# Patient Record
Sex: FEMALE
[De-identification: names, ages, dates, MRNs, and addresses within clinical notes are randomized per-mention and may not be internally consistent; named-entity substitution may affect disease eponyms.]

---

## 2017-04-10 PROBLEM — M17.12 PRIMARY OSTEOARTHRITIS OF LEFT KNEE: Status: ACTIVE | Noted: 2017-04-10

## 2017-11-01 ENCOUNTER — CHARTING TRANS (OUTPATIENT)
Dept: OTHER | Age: 72
End: 2017-11-01

## 2017-12-15 PROBLEM — M81.0 OSTEOPOROSIS: Status: ACTIVE | Noted: 2017-12-15

## 2017-12-15 PROCEDURE — 83970 ASSAY OF PARATHORMONE: CPT | Performed by: PHYSICIAN ASSISTANT

## 2018-03-21 PROBLEM — S83.411A SPRAIN OF MEDIAL COLLATERAL LIGAMENT OF RIGHT KNEE: Status: ACTIVE | Noted: 2018-03-21

## 2018-03-21 PROBLEM — M17.11 PRIMARY OSTEOARTHRITIS OF RIGHT KNEE: Status: ACTIVE | Noted: 2018-03-21

## 2019-10-03 PROBLEM — M18.11 ARTHRITIS OF CARPOMETACARPAL (CMC) JOINT OF RIGHT THUMB: Status: ACTIVE | Noted: 2019-10-03

## 2019-10-03 PROBLEM — S63.501A SPRAIN OF RIGHT WRIST, INITIAL ENCOUNTER: Status: ACTIVE | Noted: 2019-10-03

## 2019-10-21 ENCOUNTER — WALK IN (OUTPATIENT)
Dept: URGENT CARE | Age: 74
End: 2019-10-21

## 2019-10-21 DIAGNOSIS — Z23 FLU VACCINE NEED: Primary | ICD-10-CM

## 2019-10-21 PROCEDURE — G0008 ADMIN INFLUENZA VIRUS VAC: HCPCS | Performed by: NURSE PRACTITIONER

## 2019-10-21 PROCEDURE — 90662 IIV NO PRSV INCREASED AG IM: CPT | Performed by: NURSE PRACTITIONER

## 2020-06-16 ENCOUNTER — TELEPHONE (OUTPATIENT)
Dept: SURGERY | Facility: CLINIC | Age: 75
End: 2020-06-16

## 2020-06-16 NOTE — TELEPHONE ENCOUNTER
Dr Hutchison wants Dr Wei Oneal to see pt for a lump on head & pt ignored it during covid. Dr had CT done & there is a mass.  Please advise

## 2020-06-17 ENCOUNTER — OFFICE VISIT (OUTPATIENT)
Dept: SURGERY | Facility: CLINIC | Age: 75
End: 2020-06-17
Payer: MEDICARE

## 2020-06-17 VITALS
DIASTOLIC BLOOD PRESSURE: 72 MMHG | BODY MASS INDEX: 21.76 KG/M2 | WEIGHT: 137 LBS | HEIGHT: 66.5 IN | HEART RATE: 80 BPM | SYSTOLIC BLOOD PRESSURE: 118 MMHG

## 2020-06-17 DIAGNOSIS — M89.9 SKULL LESION: Primary | ICD-10-CM

## 2020-06-17 DIAGNOSIS — M79.89 SOFT TISSUE MASS: ICD-10-CM

## 2020-06-17 PROCEDURE — 99204 OFFICE O/P NEW MOD 45 MIN: CPT | Performed by: PHYSICIAN ASSISTANT

## 2020-06-17 NOTE — PROGRESS NOTES
New Pt here for brain lesion     CT of the brain: Obtained     Pt states that back in Feb 2020 she had bumped her head getting into the car. Pt states that she has a lump on the left side of the sb.

## 2020-06-17 NOTE — PROGRESS NOTES
[facility]  Neurosurgery Consult    Tanesha Sherry Patient Status:  No patient class for patient encounter    1945 MRN [id number]     REASON FOR CONSULTATION:  Skull and soft tissue lesion.      HISTORY OF PRESENT ILLNESS:  Norva Mention stenosis        PAST SURGICAL HISTORY:  Past Surgical History:   Procedure Laterality Date   • BACK SURGERY     • CHOLECYSTECTOMY     • LUMBAR EPIDURAL N/A 12/4/2014    Performed by Serafin Buchanan MD at [address] [place] [address] Drift. Finger-to-nose coordination is intact. Gait deferred. DTRs symmetric. No clonus. Neg More's.     Upper extremity strength:      Deltoid  Biceps  Triceps   W.flexion  W.extension    Finger abduction     Right 5 5 5 5  5 5 5     Left 5 5 5 5 lesion along the left frontotemporoparietal convexity measuring up to 0.6 cm  from the inner table with mild-to-moderate mass effect and associated 0.6 cm rightward midline  shift.  There is abnormal appearance of the underlying left frontotemporal parietal

## 2020-06-23 ENCOUNTER — TELEPHONE (OUTPATIENT)
Dept: SURGERY | Facility: CLINIC | Age: 75
End: 2020-06-23

## 2020-06-23 DIAGNOSIS — L98.9 SCALP LESION: Primary | ICD-10-CM

## 2020-06-23 DIAGNOSIS — M79.89 SOFT TISSUE MASS: ICD-10-CM

## 2020-06-23 NOTE — TELEPHONE ENCOUNTER
Called pt. No answer. LMTCB. Please let me know when she calls and I will attempt to call her again.

## 2020-06-23 NOTE — TELEPHONE ENCOUNTER
Pt calling stating she has hx of sarcoidosis, would like providers to be aware when viewing the results for recent imaging

## 2020-06-24 NOTE — TELEPHONE ENCOUNTER
Called pt to discuss MRI results. Discussed in tumor conference-recommendation is to have a biopsy done with IR. IR referral placed and pt given information to schedule.  Once it is scheduled, she will call to schedule an appointment with Dr. Shamika Savage for tabitha

## 2020-09-11 PROBLEM — S63.501A SPRAIN OF RIGHT WRIST, INITIAL ENCOUNTER: Status: RESOLVED | Noted: 2019-10-03 | Resolved: 2020-09-11

## 2020-09-11 PROBLEM — M79.89 SOFT TISSUE MASS: Status: RESOLVED | Noted: 2020-06-17 | Resolved: 2020-09-11

## 2020-09-11 PROBLEM — S83.411A SPRAIN OF MEDIAL COLLATERAL LIGAMENT OF RIGHT KNEE: Status: RESOLVED | Noted: 2018-03-21 | Resolved: 2020-09-11

## 2020-09-11 PROBLEM — C83.38 DIFFUSE LARGE B-CELL LYMPHOMA OF LYMPH NODES OF MULTIPLE REGIONS (HCC): Status: ACTIVE | Noted: 2020-09-11

## 2020-09-11 PROBLEM — M17.0 PRIMARY OSTEOARTHRITIS OF BOTH KNEES: Status: ACTIVE | Noted: 2018-03-21

## 2020-09-11 PROBLEM — C83.30 DLBCL (DIFFUSE LARGE B CELL LYMPHOMA) (HCC): Status: ACTIVE | Noted: 2020-09-11

## 2021-01-22 PROBLEM — E22.2 SYNDROME OF INAPPROPRIATE SECRETION OF ANTIDIURETIC HORMONE (HCC): Status: ACTIVE | Noted: 2021-01-22

## 2022-07-14 ENCOUNTER — TELEPHONE (OUTPATIENT)
Dept: SCHEDULING | Age: 77
End: 2022-07-14

## 2022-07-14 ENCOUNTER — OFFICE VISIT (OUTPATIENT)
Dept: URGENT CARE | Age: 77
End: 2022-07-14

## 2022-07-14 VITALS
OXYGEN SATURATION: 99 % | TEMPERATURE: 98 F | SYSTOLIC BLOOD PRESSURE: 122 MMHG | HEART RATE: 85 BPM | WEIGHT: 138 LBS | RESPIRATION RATE: 16 BRPM | HEIGHT: 66 IN | BODY MASS INDEX: 22.18 KG/M2 | DIASTOLIC BLOOD PRESSURE: 68 MMHG

## 2022-07-14 DIAGNOSIS — B96.89 ACUTE BACTERIAL RHINOSINUSITIS: Primary | ICD-10-CM

## 2022-07-14 DIAGNOSIS — J01.90 ACUTE BACTERIAL RHINOSINUSITIS: Primary | ICD-10-CM

## 2022-07-14 PROBLEM — M18.11 ARTHRITIS OF CARPOMETACARPAL (CMC) JOINT OF RIGHT THUMB: Status: ACTIVE | Noted: 2019-10-03

## 2022-07-14 PROBLEM — T45.1X5A CHEMOTHERAPY INDUCED NEUTROPENIA (CMD): Status: ACTIVE | Noted: 2020-09-03

## 2022-07-14 PROBLEM — D70.1 CHEMOTHERAPY INDUCED NEUTROPENIA (CMD): Status: ACTIVE | Noted: 2020-09-03

## 2022-07-14 PROBLEM — R93.3 ABNORMAL FINDINGS ON EXAMINATION OF GASTROINTESTINAL TRACT: Status: ACTIVE | Noted: 2021-07-06

## 2022-07-14 PROBLEM — T45.1X5A ANEMIA ASSOCIATED WITH CHEMOTHERAPY: Status: ACTIVE | Noted: 2020-09-03

## 2022-07-14 PROBLEM — D64.81 ANEMIA ASSOCIATED WITH CHEMOTHERAPY: Status: ACTIVE | Noted: 2020-09-03

## 2022-07-14 PROBLEM — H04.123 DRY EYE SYNDROME OF BILATERAL LACRIMAL GLANDS: Status: ACTIVE | Noted: 2017-04-07

## 2022-07-14 PROBLEM — M81.0 OSTEOPOROSIS: Status: ACTIVE | Noted: 2017-12-15

## 2022-07-14 PROBLEM — C83.38 DIFFUSE LARGE B-CELL LYMPHOMA OF LYMPH NODES OF MULTIPLE REGIONS (CMD): Status: ACTIVE | Noted: 2020-07-28

## 2022-07-14 PROCEDURE — 99214 OFFICE O/P EST MOD 30 MIN: CPT | Performed by: NURSE PRACTITIONER

## 2022-07-14 RX ORDER — AMOXICILLIN AND CLAVULANATE POTASSIUM 875; 125 MG/1; MG/1
1 TABLET, FILM COATED ORAL EVERY 12 HOURS
Qty: 14 TABLET | Refills: 0 | Status: SHIPPED | OUTPATIENT
Start: 2022-07-14 | End: 2022-07-21

## 2022-07-14 RX ORDER — GEMFIBROZIL 600 MG/1
600 TABLET, FILM COATED ORAL DAILY
COMMUNITY

## 2022-07-14 ASSESSMENT — ENCOUNTER SYMPTOMS
EYES NEGATIVE: 1
SHORTNESS OF BREATH: 0
HEADACHES: 1
RESPIRATORY NEGATIVE: 1
WHEEZING: 0
SINUS PAIN: 1
FEVER: 0
FATIGUE: 0
SINUS PRESSURE: 1
SORE THROAT: 0
COUGH: 0
RHINORRHEA: 1
ALLERGIC/IMMUNOLOGIC NEGATIVE: 1
FACIAL SWELLING: 1
NAUSEA: 0
SWOLLEN GLANDS: 0

## 2022-07-14 ASSESSMENT — PAIN SCALES - GENERAL: PAINLEVEL: 0
